# Patient Record
Sex: MALE | Race: BLACK OR AFRICAN AMERICAN
[De-identification: names, ages, dates, MRNs, and addresses within clinical notes are randomized per-mention and may not be internally consistent; named-entity substitution may affect disease eponyms.]

---

## 2018-09-01 NOTE — EKG
Test Reason : 

Blood Pressure : ***/*** mmHG

Vent. Rate : 108 BPM     Atrial Rate : 108 BPM

   P-R Int : 170 ms          QRS Dur : 076 ms

    QT Int : 332 ms       P-R-T Axes : 062 061 077 degrees

   QTc Int : 444 ms

 

Sinus tachycardia

Nonspecific ST and T wave abnormality

Abnormal ECG

 

Confirmed by KENDRICK LICONA ([id number]),  CHUY SAAVEDRA ([id number]) on 9/1/2018 8:00:06 PM

 

Referred By:             Confirmed By:KENDRICK LICONA

## 2019-03-20 NOTE — CT
CT HEAD WITHOUT CONTRAST:

 

03/20/2019

 

HISTORY:

Altered mental status and slurred speech with right-sided weakness.  Dry cough.

 

COMPARISON:

07/24/2017

 

FINDINGS:

The imaged paranasal sinuses/mastoid air cells appear well aerated.  There is no displaced calvarial 
fracture.

 

There is extensive periventricular, deep, and subcortical white matter hypodensity, evidence of small
 vessel disease, as seen on prior imaging.  Areas of prior infarction are noted near the vertex, in t
he right frontal parietal region.  Areas of prior lacunar infarction noted within the basal ganglia b
ilaterally.

 

IMPRESSION:

Chronic findings, as detailed above.  No intracranial hemorrhage.

 

If there is clinical concern for acute infarction, brain MRI recommended.

 

POS: [facility]

## 2019-03-21 NOTE — HP
PRIMARY CARE PROVIDER:  Dr. Jones at the [facility].



CHIEF COMPLAINT:  Difficulty with speech.



HISTORY OF PRESENT ILLNESS:  This is a 58-year-old  male who

presents to [facility] complaining of approximate

4-day history of difficulty with speech.  The patient noted the difficulty

approximately 4 to 5 days prior to this evaluation with family member stating 
that

he was not speaking at all initially.  The patient regained some slurred speech 
in

the last 24 to 48 hours with associated increased cough.  The family attributed 
his

cough to developing cold symptoms, however, when his speech did not return, they

became concerned.  The patient was apparently evaluated at the [facility] 
with

recommendations to transfer to [facility] for further evaluation and CT

imaging.  The patient states he has a history of CVA approximately 2 years 
prior to

this evaluation with residual right-sided weakness and some contracture of the 
right

upper extremity.  The patient needs standby/contact guard assistance for 
ambulation

at home according to family members.  The patient eats regular diet without

modification to the texture.  No specific history of fall, injury, fever, nausea
,

vomiting, or diarrhea.  The patient denies any other specific complaints other 
than

difficulty with speech and some difficulty when swallowing.  In the emergency 
room,

the patient underwent general evaluation including CT imaging of the brain 
showing

no acute intracranial process with chronic ischemic white matter changes.  The

patient received aspirin and was referred to the Hospitalist Service for

admission.  The patient does admit that he takes aspirin 325 mg daily. 



PAST MEDICAL HISTORY:  

1. Ischemic cerebrovascular accident with residual right hemiparesis and right

facial droop. 

2. Hypertension.

3. Hyperlipidemia.

4. Diabetes mellitus type 2.



PAST SURGICAL HISTORY:  

1. Status post left below-the-knee amputation secondary to a forklift accident.

2. Status post drainage of abscess on the neck.



CURRENT MEDICATIONS:  

1. Enteric-coated aspirin 325mg p.o. daily.

2. Lisinopril 5 mg p.o. daily.  

3. Glucotrol XL 5 mg p.o. b.i.d.

4. Metformin 1000 mg p.o. b.i.d.  

5. Simvastatin 10 mg p.o. at bedtime.



ALLERGIES:  NO KNOWN DRUG ALLERGIES.



FAMILY HISTORY:  Positive for hypertension and diabetes mellitus type 2.



SOCIAL HISTORY:  The patient is accompanied by his sister in the hospital.  
Resides

in [place], [place].  Disabled.  Remote history of marijuana use.  No current 
alcohol.

 Smokes cigars, quit in the last year.  Ambulatory with standby/contact guard

assistance.  Needs assistance with activities of daily living. 



REVIEW OF SYSTEMS:  CONSTITUTIONAL:  Negative for weight loss or gain, ability 
to

conduct usual activities. 

SKIN:  Negative for rash, itching. 

EYES:  Negative for double vision, pain. 

ENT/MOUTH:  Negative for nose bleeding, neck stiffness, pain, tenderness. 

CARDIOVASCULAR:  Negative for palpitations, dyspnea on exertion, orthopnea. 

RESPIRATORY:  Negative for shortness of breath, wheezing, cough, hemoptysis, 
fever

or night sweats. 

GASTROINTESTINAL:  Negative for poor appetite, abdominal pain, heartburn, nausea
,

vomiting, constipation, or diarrhea. 

GENITOURINARY:  Negative for urgency, frequency, dysuria, nocturia. 

MUSCULOSKELETAL:  Negative for pain, swelling. 

NEUROLOGIC/PSYCHIATRIC:  Negative for anxiety, depression. 

ALLERGY/IMMUNOLOGIC:  Negative for skin rash, bleeding tendency. 



Otherwise, negative except as stated per HPI.



PHYSICAL EXAMINATION:

VITAL SIGNS:  On admission, blood pressure 138/86, pulse 98, respiratory rate 18
,

temperature 98.9 degrees Fahrenheit, O2 saturation 98% on room air. 

GENERAL APPEARANCE:  This is a 58-year-old  male, alert and 
oriented

x3, pleasant with dysarthria. 

HEENT:  Pupils are equal, round, and reactive to light and accommodation.

Extraocular muscles are intact.  No scleral icterus.  No conjunctival injection.

Nares patent.  OP is clear.  Teeth in fair repair.  Right facial asymmetry 
noted. 

NECK:  Supple.  No cervical adenopathy.  No thyromegaly.  No carotid bruits.  
No JVD

appreciated.  Cervical spine with full active and passive range of motion.  No

meningeal signs noted. 

CHEST:  Lungs are clear to auscultation bilaterally. 

CARDIOVASCULAR:  S1 and S2 without noted murmur, rub, or gallop. 

ABDOMEN:  Rounded, soft, nontender, and nondistended.  Bowel sounds are 
positive in

all 4 quadrants.  There is no hepatosplenomegaly.  No abdominal bruits.  No 
rebound

or guarding appreciated. 

EXTREMITIES:  Warm and dry with fair turgor.  No clubbing, cyanosis, or 
asymmetric

edema appreciated.  Pulses are palpable distally at the dorsalis pedis, 
posterior

tibial, and popliteal arteries bilaterally.  Capillary refill less than 2 
seconds. 

NEUROLOGIC:  Positive dysarthria and expressive dysphasia.  Chronic right

hemiparesis with right upper extremity contracture.  Left below-the-knee 
amputation

noted. 



PERTINENT LAB AND X-RAY FINDINGS:  Sodium 138, potassium 4.1, chloride 106, CO2 
of

22, BUN 10, creatinine 0.81, estimated GFR greater than 90, glucose 120, calcium

8.9.  LFTs within normal limits.  CBC showed white blood cell count of 6.4,

hemoglobin 12, hematocrit 35, platelet count 210 with normal differential.  CT 
of

the brain without contrast dated 03/20/2019, showed chronic changes bilaterally

without acute process.  EKG dated 03/20/2019, by my interpretation shows sinus

mechanism with heart rates in the 70s.  Normal R-wave progression noted in the

precordial leads.  Normal axis.  No acute ST-T wave changes appreciated. 



ASSESSMENT AND PLAN:  

1. Acute/subacute ischemic cerebrovascular accident, suspect left-sided 
involvement

in Broca's region.  We will obtain MRI imaging to confirm.  Likely subacute

presentation.  Transition to Plavix 75 mg daily as the patient is likely an 
aspirin

failure.  Check fasting lipid profile per stroke protocol.  Check 2D 
transthoracic

echocardiogram and carotid Doppler study.  PT, OT, and Speech Therapy 
evaluation. 

2. Dysphasia.  We will obtain speech therapy consultation.  N.p.o. status 
pending

evaluation. 

3. Expressive dysphasia/dysarthria.  Secondarily to #1.  We will continue to 
monitor

clinically.  Continue general stroke protocol.  Plavix 75 mg p.o. daily. 

4. Hypertension.  Resume home regimen of lisinopril.  Serial blood pressure

monitoring. 

5. Hyperlipidemia.  Check fasting lipid profile in the a.m.  Continue metformin 
1000

mg p.o. b.i.d. 

6. Diabetes mellitus type 2.  Insulin sliding scale for reflexive coverage.

Metformin 1000 mg p.o. b.i.d.  Accu-Cheks a.c. and at bedtime.  ADA diet. 

7. Prophylaxis.  Sequential compression devices while in bed.  Pepcid 20 mg p.o.

b.i.d. 



CODE STATUS:  Full.  Surrogate medical decision maker is the patient's sister.







Job ID:  [id number]



MTDD

## 2019-03-21 NOTE — MRI
BRAIN MRI WITHOUT CONTRAST:

 

Date:  03/21/19 

 

COMPARISON:  

07/25/17. 

 

HISTORY:  

Altered mental status, right-sided weakness, slurred speech, assess for acute infarction. 

 

TECHNIQUE:  

Multiplanar, multisequence MR imaging of the brain provided without contrast. 

 

FINDINGS:

The diffusion-weighted imaging demonstrates a subcentimeter focus of restricted diffusion, consistent
 with acute infarction involving the ventral aspect of the cory just below the junction with the midb
rain on the right. This focus of acute infarction measures in the 5.0 mm range. No additional foci of
 restricted diffusion are noted. 

 

The axial gradient echo imaging demonstrates no evidence for acute hemorrhage. There is moderate diff
use cerebral volume loss. There is extensive multifocal periventricular, deep, and subcortical white 
matter T2 and FLAIR hyperintensity, evidence of small vessel disease. There is evidence of prior infa
rction involving the frontal lobe and frontoparietal region on the right near the vertex. No midline 
shift or mass effect is seen. The imaged paranasal sinuses/mastoid air cells appear well aerated. Reg
ional bone marrow signal intensity appears grossly unremarkable. 

 

IMPRESSION: 

5.0 mm acute infarction of the ventral aspect of the cory on the right. Evidence of small vessel dise
ase and prior infarctions as detailed above. 

 

 

POS: [facility]

## 2019-03-21 NOTE — PDOC.PN
- Subjective


Encounter Start Date: 03/21/19


Encounter Start Time: 11:09


Subjective: speech still dysarthric





- Objective


Resuscitation Status - Order Detail:





03/20/19 19:40


Resuscitation Status Routine 


   Resuscitation Status: FULL: Full Resuscitation








MAR Reviewed: Yes


Vital Signs & Weight: 


 Vital Signs (12 hours)











  Temp Pulse Resp BP BP Pulse Ox


 


 03/21/19 10:25   84   176/85 H  


 


 03/21/19 08:00  97.9 F  65  20   155/83 H  99


 


 03/21/19 04:00  98.1 F  75  16   124/70  100


 


 03/21/19 00:00  97.9 F  81  16   127/75  99








 Weight











Weight                         156 lb 11.979 oz














I&O: 


 











 03/20/19 03/21/19 03/22/19





 06:59 06:59 06:59


 


Intake Total  1021 


 


Output Total  875 


 


Balance  146 











Result Diagrams: 


 03/21/19 05:36





 03/21/19 05:36


Additional Labs: 


 Accuchecks











  03/21/19 03/21/19 03/20/19





  10:54 05:27 20:21


 


POC Glucose  125 H  100  96














  03/20/19





  16:49


 


POC Glucose  109











Radiology Reviewed by me: Yes (MRI acute cory infarct)





Phys Exam





- Physical Examination


Neck: no JVD


Respiratory: clear to auscultation bilateral


Cardiovascular: RRR, no significant murmur


Gastrointestinal: soft, positive bowel sounds


Musculoskeletal: no edema


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 old R hemiplegia





Dx/Plan


(1) Hemiplegia affecting right dominant side


Code(s): G81.91 - HEMIPLEGIA, UNSPECIFIED AFFECTING RIGHT DOMINANT SIDE   Status

: Acute   


Qualifiers: 


   Hemiplegia type: spastic   Hemiplegia etiology: late effect of 

cerebrovascular disease   Cerebrovascular disease type: cerebral infarction   

Qualified Code(s): I69.351 - Hemiplegia and hemiparesis following cerebral 

infarction affecting right dominant side   





(2) CVA (cerebral vascular accident)


Code(s): I63.9 - CEREBRAL INFARCTION, UNSPECIFIED   Status: Acute   


Qualifiers: 


   CVA mechanism: thrombosis 





(3) Diabetes


Code(s): E11.9 - TYPE 2 DIABETES MELLITUS WITHOUT COMPLICATIONS   Status: 

Chronic   


Qualifiers: 


   Diabetes mellitus type: type 2   Diabetes mellitus long term insulin use: 

without long term use   Diabetes mellitus complication status: with neurologic 

complications   Diabetes mellitus complication detail: with other neurological 

complication   Qualified Code(s): E11.49 - Type 2 diabetes mellitus with other 

diabetic neurological complication   





(4) Dyslipidemia


Code(s): E78.5 - HYPERLIPIDEMIA, UNSPECIFIED   Status: Chronic   





(5) HTN (hypertension)


Code(s): I10 - ESSENTIAL (PRIMARY) HYPERTENSION   Status: Chronic   


Qualifiers: 


   Hypertension type: essential hypertension   Qualified Code(s): I10 - 

Essential (primary) hypertension   





- Plan


cont ASA/plavix


-: cont PT/OT/speech


-: cont aaccu/SS/OHA


-: cont statin, lisinopril





* .

## 2019-03-21 NOTE — ULT
CAROTID ULTRASOUND:

 

HISTORY: 

CVA.

 

COMPARISON: 

7/25/2017.

 

TECHNIQUE:

Multiplanar, gray scale, and color Doppler images are obtained in a carotid ultrasound.  Spectral jarrod
lysis of the Doppler waveforms was performed.

 

FINDINGS: 

No significant plaque is seen in either common or internal carotid artery.  The Doppler waveforms are
 normal bilaterally.

 

Peak systolic velocity in the right ICA is 32 cm/s.  Peak systolic velocity in the right CCA is 53 cm
/s.  The right ICA/CCA ratio is 0.6.

 

Peak systolic velocity in th left ICA is 47 cm/s.  Peak systolic velocity in the left CCA is 84 cm/s.
  The left ICA/CCA ratio is 0.6.

 

Both vertebral arteries demonstrate antegrade flow without focal stenosis.

 

IMPRESSION: 

No evidence of hemodynamically significant stenosis.

 

POS: [facility]

## 2019-03-21 NOTE — CON
DATE OF CONSULTATION:  03/21/2019



CONSULTING PHYSICIAN:  Hospitalist Services.



IMPRESSION:  

1. Pontine stroke.

2. Prior stroke with right hemiparesis.

3. Hypertension.

4. Diabetes.

5. Aspirin failure.



PLAN:  

1. Add Plavix.

2. The patient will be discharged home.



HISTORY OF PRESENT ILLNESS:  Mr. Yeager is a 58-year-old man with a past history of

stroke two years ago, leaving him with a right hemiparesis.  He has been compliant

with his treatment.  He developed increased dysarthria and came into the emergency

room.  Initial CT scan showed extensive small vessel ischemic changes bilaterally.

MRI revealed an acute area of infarction involving the right cory.  His carotid

Dopplers are clear.  He had a normal echo last year with ejection fraction of 50% to

55%.  He reports that he is a bit better, but it has not cleared up to his baseline. 



PAST MEDICAL HISTORY:  As listed above.



ALLERGIES:  NONE REPORTED.



SOCIAL HISTORY:  He is .  Does not smoke.



FAMILY HISTORY:  Noncontributory.



MEDICATIONS:  Medication list was reviewed.



REVIEW OF SYSTEMS:  A 10-system review of systems is otherwise negative.



PHYSICAL EXAMINATION:

GENERAL:  He is a well-nourished, middle-aged man, lying in bed, in no distress. 

HEENT:  Pupils are equal and reactive.  Conjunctivae are clear.  Oropharynx is

clear.  Cranium, normocephalic and atraumatic. 

NECK:  Supple.  No lymphadenopathy. 

EXTREMITIES:  There is a left BKA.  No edema is present. 

NEUROLOGIC:  He is alert and cooperative.  His speech is fluent with a moderately

dysarthric quality.  Cranial nerve exam shows a right facial droop.  Motor exam

shows good strength on the left and flexion contracture of the right upper

extremity.  He has limited mobility in the right leg.  Sensation is intact.  No

abnormal movements are seen. 



IMAGING:  Reviewed. 



EKG shows a sinus rhythm.



SUMMARY:  This is an unfortunate middle-aged man with extensive small vessel disease

resulting in a new pontine infarct with limited neurologic injury.  Continue his

statin and aspirin, and add Plavix.  I would be happy to follow up with him as an

outpatient. 







Job ID:  [id number]

## 2019-03-22 NOTE — PDOC.PN
- Subjective


Encounter Start Date: 03/22/19


Encounter Start Time: 13:18


Subjective: Admitted with dysarthria due to acute CVA.


-: feeling better. No chest pain, palpitation or headache.


-: Now on thickened liquid due to dysphagia





- Objective


Resuscitation Status - Order Detail:





03/20/19 19:40


Resuscitation Status Routine 


   Resuscitation Status: FULL: Full Resuscitation








Vital Signs & Weight: 


 Vital Signs (12 hours)











  Temp Pulse Pulse Resp BP BP BP


 


 03/22/19 11:37  98.6 F  90   16    165/95 H


 


 03/22/19 10:12    75    176/86 H 


 


 03/22/19 08:55   70    136/73  


 


 03/22/19 08:00       


 


 03/22/19 07:40  98.6 F  70   17    138/77


 


 03/22/19 04:00  98.7 F  70   18    128/73














  Pulse Ox


 


 03/22/19 11:37  98


 


 03/22/19 10:12 


 


 03/22/19 08:55 


 


 03/22/19 08:00  96


 


 03/22/19 07:40  96


 


 03/22/19 04:00  100








 Weight











Admit Weight                   156 lb 11.979 oz


 


Weight                         156 lb 11.979 oz














I&O: 


 











 03/21/19 03/22/19 03/23/19





 06:59 06:59 06:59


 


Intake Total 1021 100 


 


Output Total 875 626 370


 


Balance 146 -275 -370











Result Diagrams: 


 03/21/19 05:36





 03/21/19 05:36


Additional Labs: 


 Accuchecks











  03/22/19 03/22/19 03/21/19





  10:56 05:49 19:22


 


POC Glucose  153 H  142 H  134 H














  03/21/19





  16:45


 


POC Glucose  199 H














Phys Exam





- Physical Examination


HEENT: PERRLA, moist MMs


Neck: no JVD, supple


Respiratory: no rales, no rhonchi


Fair air entry with some transmitted sound


Cardiovascular: RRR


Gastrointestinal: soft, non-tender, no distention


Musculoskeletal: no edema


Left BKA, Right upper limb fixed flexion deformity of the elbow and wrist


right hemiparesis


dysarthria noted.


Psychiatric: A&O x 3





Dx/Plan


(1) Acute CVA (cerebrovascular accident)


Code(s): I63.9 - CEREBRAL INFARCTION, UNSPECIFIED   Status: Acute   





(2) History of left below knee amputation


Code(s): Z89.512 - ACQUIRED ABSENCE OF LEFT LEG BELOW KNEE   Status: Acute   





(3) Hemiplegia affecting right dominant side


Code(s): G81.91 - HEMIPLEGIA, UNSPECIFIED AFFECTING RIGHT DOMINANT SIDE   Status

: Acute   


Qualifiers: 


   Hemiplegia type: spastic   Hemiplegia etiology: late effect of 

cerebrovascular disease   Cerebrovascular disease type: cerebral infarction   

Qualified Code(s): I69.351 - Hemiplegia and hemiparesis following cerebral 

infarction affecting right dominant side   





(4) DM2 (diabetes mellitus, type 2)


Status: Chronic   





(5) Dyslipidemia


Code(s): E78.5 - HYPERLIPIDEMIA, UNSPECIFIED   Status: Chronic   





(6) HTN (hypertension)


Code(s): I10 - ESSENTIAL (PRIMARY) HYPERTENSION   Status: Chronic   


Qualifiers: 


   Hypertension type: essential hypertension   Qualified Code(s): I10 - 

Essential (primary) hypertension   





(7) Gait instability


Code(s): R26.81 - UNSTEADINESS ON FEET   Status: Acute   





- Plan


Increase lisinopril to 20 daily.


-: continue ASA and plavix.


-: Continue other medications.


-: get Rehab evaluation


-: Monitor renal function.





* .

## 2019-03-23 NOTE — PDOC.PN
- Subjective


Encounter Start Date: 03/23/19


Encounter Start Time: 11:28


Subjective: No new problem.


-: Awaiting Placement





- Objective


Resuscitation Status - Order Detail:





03/20/19 19:40


Resuscitation Status Routine 


   Resuscitation Status: FULL: Full Resuscitation








Vital Signs & Weight: 


 Vital Signs (12 hours)











  Temp Pulse Resp BP BP Pulse Ox


 


 03/23/19 09:55     164/80 H  


 


 03/23/19 07:37  99 F  65  16   164/80 H  95


 


 03/23/19 04:00  97.8 F  72  16   153/73 H  100


 


 03/23/19 00:00  98.4 F  75  16   125/74  100








 Weight











Admit Weight                   156 lb 11.979 oz


 


Weight                         156 lb 11.979 oz














I&O: 


 











 03/22/19 03/23/19 03/24/19





 06:59 06:59 06:59


 


Intake Total 100 237 


 


Output Total 375 1020 


 


Balance -016 -098 











Result Diagrams: 


 03/21/19 05:36





 03/23/19 05:59


Additional Labs: 


 Accuchecks











  03/23/19 03/23/19 03/22/19





  10:57 05:21 20:07


 


POC Glucose  195 H  132 H  192 H














  03/22/19





  16:56


 


POC Glucose  133 H














Phys Exam





- Physical Examination


Constitutional: NAD


HEENT: PERRLA, moist MMs


Neck: supple


Respiratory: no rhonchi, clear to auscultation bilateral


Cardiovascular: RRR, no significant murmur


Gastrointestinal: soft, non-tender, no distention, positive bowel sounds


Musculoskeletal: no edema


Right upper limb fixed flexion defomity at the elbow and wrist.


LBKA noted


Right hemiparesis noted.


Psychiatric: A&O x 3





Dx/Plan


(1) Acute CVA (cerebrovascular accident)


Code(s): I63.9 - CEREBRAL INFARCTION, UNSPECIFIED   Status: Acute   





(2) History of left below knee amputation


Code(s): Z89.512 - ACQUIRED ABSENCE OF LEFT LEG BELOW KNEE   Status: Acute   





(3) Hemiplegia affecting right dominant side


Code(s): G81.91 - HEMIPLEGIA, UNSPECIFIED AFFECTING RIGHT DOMINANT SIDE   Status

: Acute   


Qualifiers: 


   Hemiplegia type: spastic   Hemiplegia etiology: late effect of 

cerebrovascular disease   Cerebrovascular disease type: cerebral infarction   

Qualified Code(s): I69.351 - Hemiplegia and hemiparesis following cerebral 

infarction affecting right dominant side   





(4) DM2 (diabetes mellitus, type 2)


Status: Chronic   





(5) Dyslipidemia


Code(s): E78.5 - HYPERLIPIDEMIA, UNSPECIFIED   Status: Chronic   





(6) HTN (hypertension)


Code(s): I10 - ESSENTIAL (PRIMARY) HYPERTENSION   Status: Chronic   


Qualifiers: 


   Hypertension type: essential hypertension   Qualified Code(s): I10 - 

Essential (primary) hypertension   





(7) Gait instability


Code(s): R26.81 - UNSTEADINESS ON FEET   Status: Acute   





- Plan


Increase lisinopril to 20 bidContinue other treatments.


-: awaiting placement.





* .

## 2019-07-08 NOTE — DIS
DATE OF ADMISSION:  03/20/2019



DATE OF DISCHARGE:  03/23/2019



DISCHARGE DIAGNOSES:  

1. Acute cerebrovascular accident.

2. Prior cerebrovascular accident with residual right hemiplegia.

3. Status post left below-knee amputation.

4. Gait instability.

5. Dysphagia.

6. Hypertension.

7. Dyslipidemia.

8. Type 2 diabetes mellitus.



CONSULTS:  

1. Neurology.

2. Physical Therapy.

3. Occupational Therapy.



HOSPITAL COURSE:  A 58-year-old male with prior history of stroke with residual

right-sided hemiparesis, admitted with acute onset of dysarthria.  Initial CT showed

extensive small-vessel changes bilaterally with subsequent MRI revealed acute

infarct involving the right cory.  Carotid Dopplers were unremarkable, and the

echocardiogram showed preserved ejection fraction of 50% to 55%.  The patient was

started on restorative therapy with PT, OT, and Speech.  He was found to have

dysphagia, hence was started on nectar thickened liquid with puree consistency.  The

patient also received physical therapy with improvement.  He however was found to

have gait instability, which was magnified by use of left prosthesis, hence was

discharged to a skilled nursing facility for restorative therapy.  The patient also

was found to have uncontrolled high blood pressure.  Hence, lisinopril dose was

increased from 5 b.i.d. to 20 b.i.d.  The patient also was started on Plavix in

addition to aspirin as recommended by Neurology since it is deemed that he failed

aspirin.  Also Lipitor was increased to 80 mg. 



PHYSICAL EXAMINATION:

VITAL SIGNS:  Temperature 98.3, pulse 70, respiratory rate 16, SpO2 99 on room air.

Blood pressure 149/78. 

GENERAL:  Middle aged male, in no obvious distress.  Afebrile.  Anicteric. 

HEENT:  Normocephalic, atraumatic.  Pupils are equal and reacting to light. 

RESPIRATORY:  Good air entry bilateral with no obvious crackle or rhonchi. 

CARDIOVASCULAR:  Regular rhythm and rate with normal heart sounds 1 and 2.  No

murmur was appreciated. 

GI:  Abdomen is full, soft, nontender, nondistended with normal bowel sounds. 

EXTREMITIES:  Left BKA noted.  No obvious edema or erythema was appreciated. 

NEUROLOGIC:  Conscious and alert, oriented x3 with appropriate mental status.  Some

dysarthria noted.  Right-sided mild hemiparesis noted. 



DIAGNOSTIC DATA:  MRI performed on 03/21 showed a 5.0 mm acute infarction of the

ventral aspect of the cory on the right side.  Also noted were evidence of

small-vessel disease and prior infarctions. 



DISCHARGE CONDITION:  Improved and stable.



FOLLOWUP:  The patient is to follow with PCP in 1 week of discharge from the skilled

nursing facility. 



DISCHARGE MEDICATIONS:  

1. Glipizide 5 mg p.o. b.i.d.

2. Metformin 1000 mg p.o. b.i.d.

3. Acetaminophen 1000 mg p.o. q.6 p.r.n.

4. Aspirin 325 mg p.o. daily.

5. Lipitor 40 mg p.o. daily.

6. Plavix 75 mg p.o. daily.

7. Lisinopril 20 mg p.o. b.i.d.

8. Ondansetron 4 mg p.o. q.6 p.r.n. for nausea.



TIME SPENT:  Discharge took more than 37 minutes.







Job ID:  [id number]
moderate assist (50% patients effort)

## 2020-01-23 ENCOUNTER — HOSPITAL ENCOUNTER (EMERGENCY)
Dept: HOSPITAL 92 - ERS | Age: 60
Discharge: HOME | End: 2020-01-23
Payer: MEDICAID

## 2020-01-23 DIAGNOSIS — Z79.899: ICD-10-CM

## 2020-01-23 DIAGNOSIS — Z86.73: ICD-10-CM

## 2020-01-23 DIAGNOSIS — E78.00: ICD-10-CM

## 2020-01-23 DIAGNOSIS — Z87.891: ICD-10-CM

## 2020-01-23 DIAGNOSIS — S01.112A: Primary | ICD-10-CM

## 2020-01-23 DIAGNOSIS — S01.21XA: ICD-10-CM

## 2020-01-23 DIAGNOSIS — I10: ICD-10-CM

## 2020-01-23 DIAGNOSIS — E11.9: ICD-10-CM
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
## 2020-01-23 DIAGNOSIS — Z79.84: ICD-10-CM

## 2020-01-23 DIAGNOSIS — M62.421: ICD-10-CM

## 2020-01-23 DIAGNOSIS — Z79.82: ICD-10-CM

## 2020-01-23 DIAGNOSIS — E78.5: ICD-10-CM

## 2020-01-23 PROCEDURE — 12011 RPR F/E/E/N/L/M 2.5 CM/<: CPT

## 2020-12-19 ENCOUNTER — HOSPITAL ENCOUNTER (EMERGENCY)
Dept: HOSPITAL 92 - ERS | Age: 60
Discharge: HOME | End: 2020-12-19
Payer: MEDICARE

## 2020-12-19 DIAGNOSIS — Z87.891: ICD-10-CM

## 2020-12-19 DIAGNOSIS — Z86.73: ICD-10-CM

## 2020-12-19 DIAGNOSIS — E11.9: ICD-10-CM

## 2020-12-19 DIAGNOSIS — E78.5: ICD-10-CM

## 2020-12-19 DIAGNOSIS — I10: ICD-10-CM

## 2020-12-19 DIAGNOSIS — E78.00: ICD-10-CM

## 2020-12-19 DIAGNOSIS — K59.00: Primary | ICD-10-CM

## 2020-12-19 PROCEDURE — 74022 RADEX COMPL AQT ABD SERIES: CPT

## 2020-12-19 NOTE — RAD
KUB AND UPRIGHT AND PA CHEST:

 

HISTORY: 

Abdominal pain, constipation.

 

FINDINGS: 

Some gaseous distention of the abdomen.  There is some moderate stool present within the transverse a
nd descending colon and prominent stool in the rectosigmoid region.  No free air.  No definite renal 
calculi.  There are arthritic changes of the spine and hips.

 

PA CHEST:

Heart size is within normal limits.  The lungs are clear of infiltrates.  There is slight elevation o
f the right hemidiaphragm.

 

IMPRESSION: 

A moderate amount of stool within the colon.  No obstruction or free air.

 

POS: [facility]ME[facility]

## 2021-01-09 ENCOUNTER — HOSPITAL ENCOUNTER (EMERGENCY)
Dept: HOSPITAL 92 - ERS | Age: 61
Discharge: HOME | End: 2021-01-09
Payer: MEDICARE

## 2021-01-09 DIAGNOSIS — K59.00: ICD-10-CM

## 2021-01-09 DIAGNOSIS — E78.5: ICD-10-CM

## 2021-01-09 DIAGNOSIS — E78.00: ICD-10-CM

## 2021-01-09 DIAGNOSIS — Z86.73: ICD-10-CM

## 2021-01-09 DIAGNOSIS — K52.9: ICD-10-CM

## 2021-01-09 DIAGNOSIS — N30.90: Primary | ICD-10-CM

## 2021-01-09 DIAGNOSIS — E11.9: ICD-10-CM

## 2021-01-09 DIAGNOSIS — Z87.891: ICD-10-CM

## 2021-01-09 DIAGNOSIS — I10: ICD-10-CM

## 2021-01-09 LAB
ALBUMIN SERPL BCG-MCNC: 4 G/DL (ref 3.5–5)
ALP SERPL-CCNC: 66 U/L (ref 40–110)
ALT SERPL W P-5'-P-CCNC: (no result) U/L (ref 8–55)
ANION GAP SERPL CALC-SCNC: 13 MMOL/L (ref 10–20)
AST SERPL-CCNC: 7 U/L (ref 5–34)
BACTERIA UR QL AUTO: (no result) HPF
BASOPHILS # BLD AUTO: 0.1 THOU/UL (ref 0–0.2)
BASOPHILS NFR BLD AUTO: 0.7 % (ref 0–1)
BILIRUB SERPL-MCNC: 0.6 MG/DL (ref 0.2–1.2)
BUN SERPL-MCNC: 10 MG/DL (ref 8.4–25.7)
CALCIUM SERPL-MCNC: 8.8 MG/DL (ref 7.8–10.44)
CHLORIDE SERPL-SCNC: 103 MMOL/L (ref 98–107)
CO2 SERPL-SCNC: 23 MMOL/L (ref 22–29)
CREAT CL PREDICTED SERPL C-G-VRATE: 0 ML/MIN (ref 70–130)
EOSINOPHIL # BLD AUTO: 0.1 THOU/UL (ref 0–0.7)
EOSINOPHIL NFR BLD AUTO: 0.8 % (ref 0–10)
GLOBULIN SER CALC-MCNC: 3.5 G/DL (ref 2.4–3.5)
GLUCOSE SERPL-MCNC: 156 MG/DL (ref 70–105)
HGB BLD-MCNC: 11.9 G/DL (ref 14–18)
LEUKOCYTE ESTERASE UR QL STRIP.AUTO: 75 LEU/UL
LIPASE SERPL-CCNC: 24 U/L (ref 8–78)
LYMPHOCYTES # BLD: 1.5 THOU/UL (ref 1.2–3.4)
LYMPHOCYTES NFR BLD AUTO: 18 % (ref 21–51)
MCH RBC QN AUTO: 30.3 PG (ref 27–31)
MCV RBC AUTO: 87.7 FL (ref 78–98)
MONOCYTES # BLD AUTO: 0.4 THOU/UL (ref 0.11–0.59)
MONOCYTES NFR BLD AUTO: 4.7 % (ref 0–10)
NEUTROPHILS # BLD AUTO: 6.3 THOU/UL (ref 1.4–6.5)
NEUTROPHILS NFR BLD AUTO: 75.9 % (ref 42–75)
PLATELET # BLD AUTO: 213 THOU/UL (ref 130–400)
POTASSIUM SERPL-SCNC: 4 MMOL/L (ref 3.5–5.1)
RBC # BLD AUTO: 3.93 MILL/UL (ref 4.7–6.1)
RBC UR QL AUTO: (no result) HPF (ref 0–3)
SODIUM SERPL-SCNC: 135 MMOL/L (ref 136–145)
SP GR UR STRIP: 1.01 (ref 1–1.04)
WBC # BLD AUTO: 8.3 THOU/UL (ref 4.8–10.8)
WBC UR QL AUTO: (no result) HPF (ref 0–3)

## 2021-01-09 PROCEDURE — 85025 COMPLETE CBC W/AUTO DIFF WBC: CPT

## 2021-01-09 PROCEDURE — 80053 COMPREHEN METABOLIC PANEL: CPT

## 2021-01-09 PROCEDURE — 81003 URINALYSIS AUTO W/O SCOPE: CPT

## 2021-01-09 PROCEDURE — 93005 ELECTROCARDIOGRAM TRACING: CPT

## 2021-01-09 PROCEDURE — 96366 THER/PROPH/DIAG IV INF ADDON: CPT

## 2021-01-09 PROCEDURE — 74177 CT ABD & PELVIS W/CONTRAST: CPT

## 2021-01-09 PROCEDURE — 87077 CULTURE AEROBIC IDENTIFY: CPT

## 2021-01-09 PROCEDURE — 71045 X-RAY EXAM CHEST 1 VIEW: CPT

## 2021-01-09 PROCEDURE — 87086 URINE CULTURE/COLONY COUNT: CPT

## 2021-01-09 PROCEDURE — 87186 SC STD MICRODIL/AGAR DIL: CPT

## 2021-01-09 PROCEDURE — 51701 INSERT BLADDER CATHETER: CPT

## 2021-01-09 PROCEDURE — 83690 ASSAY OF LIPASE: CPT

## 2021-01-09 PROCEDURE — 96365 THER/PROPH/DIAG IV INF INIT: CPT

## 2021-01-09 PROCEDURE — 36415 COLL VENOUS BLD VENIPUNCTURE: CPT

## 2021-01-09 PROCEDURE — 81015 MICROSCOPIC EXAM OF URINE: CPT

## 2021-01-09 PROCEDURE — 83605 ASSAY OF LACTIC ACID: CPT

## 2021-01-09 NOTE — CT
CT ABDOMEN AND PELVIS PERFORMED WITH CONTRAST ENHANCEMENT:

1/9/21

 

HISTORY: 

Abdominal pain, constipation. 

 

COMPARISON: 

A 7/29/19 study. 

 

The lung bases show some bibasilar parenchymal lung changes that are similar to the previous exam and
 are probably all related to scarring given the chronicity. 

 

The liver and spleen show no focal abnormalities. The pancreas region is unremarkable. The gallbladde
r is contracted with small stones present. Some minimal intrahepatic ductal prominence present. Extra
hepatic duct does not appear dilated. 

 

Right and left adrenal glands and right and left kidneys are normal in size. There is no significant 
periaortic or mesenteric adenopathy. 

 

CT OF PELVIS PERFORMED WITH CONTRAST:

Large amount of stool is seen within the descending and sigmoid colon and rectosigmoid region. Some p
resacral fat stranding would suggest possibility of a stercoral colitis. No pelvic lymphadenopathy or
 mass. 

 

IMPRESSION: 

1.      Contracted gallbladder with gallstones. 

2.      Large amount of stool mainly in the descending and sigmoid colon. Suggestion of some wall thi
ckening to the rectum region. Some minimal fat stranding in the presacral fat. Changes would suggest 
the possibility of a stercoral colitis. 

 

POS: YASME[facility]

## 2021-01-09 NOTE — RAD
PORTABLE CHEST:

1/9/21

 

HISTORY: 

Tachycardia. 

 

COMPARISON: 

7/29/19 study.

 

Heart size and mediastinum are within normal limits. The lungs are clear of any infiltrative process.
 

 

IMPRESSION: 

No active intrathoracic disease. 

 

POS: [facility]ME[facility]

## 2021-01-16 NOTE — EKG
Test Reason : 

Blood Pressure : ***/*** mmHG

Vent. Rate : 105 BPM     Atrial Rate : 105 BPM

   P-R Int : 168 ms          QRS Dur : 076 ms

    QT Int : 338 ms       P-R-T Axes : 068 051 038 degrees

   QTc Int : 446 ms

 

Sinus tachycardia

Septal infarct , age undetermined

Abnormal ECG

 

Confirmed by AMOL PEREZ [id number]),  BLAS MAYO ([id number]) on 1/16/2021 10:18:23 AM

 

Referred By:             Confirmed By:AMOL VARGAS

## 2021-02-06 NOTE — RAD
RADIOGRAPH CHEST 1 VIEW:



DATE:

2/6/2021



HISTORY:

60-year-old male with altered mental status. Concern for aspiration.



FINDINGS:

Patient's right hand and wrist completely obscures right lateral mid and lower lung zones. There are 
no airspace densities, pulmonary edema, pneumothorax, or cardiomegaly. The lateral costophrenic

angles are sharp.



IMPRESSION:

No acute cardiopulmonary findings.



Reported By: Kel Vyas 

Electronically Signed:  2/6/2021 4:20 PM

## 2021-02-06 NOTE — CT
CT BRAIN NONCONTRAST:



DATE:

2/6/2021



HISTORY:

60-year-old male with altered mental status, aphasia



COMPARISON:

6/28/2019



FINDINGS:

Diffuse involutional changes of the brain, and advanced chronic ischemic white matter changes of the 
brain, both greater than expected for this age group.

No obstructive hydrocephalus, acute intra-axial or extra-axial hemorrhage, mass effect, midline shift
, or extra-axial fluid collection.

Several small old cortical infarctions in bilateral frontal and right parietal regions.

Numerous small and tiny old lacunar infarctions in bilateral basal ganglia, thalami, and brainstem.

No interval change overall.



IMPRESSION:

1) no acute intracranial findings.

2) multiple small old cortical cerebral infarctions.

3) numerous tiny old lacunar infarctions in deep gray nuclei and brainstem

4) involutional changes and chronic ischemic white matter changes, greater than expected for this age
.



Reported By: Kel Vyas 

Electronically Signed:  2/6/2021 4:32 PM

## 2021-10-06 ENCOUNTER — HOSPITAL ENCOUNTER (INPATIENT)
Dept: HOSPITAL 92 - CSHERS | Age: 61
LOS: 16 days | Discharge: NURSING, REHAB OR LONG-TERM CARE | DRG: 871 | End: 2021-10-22
Attending: INTERNAL MEDICINE | Admitting: INTERNAL MEDICINE
Payer: MEDICARE

## 2021-10-06 VITALS — BODY MASS INDEX: 22.6 KG/M2

## 2021-10-06 DIAGNOSIS — Z79.899: ICD-10-CM

## 2021-10-06 DIAGNOSIS — I69.391: ICD-10-CM

## 2021-10-06 DIAGNOSIS — Z74.01: ICD-10-CM

## 2021-10-06 DIAGNOSIS — D63.8: ICD-10-CM

## 2021-10-06 DIAGNOSIS — R65.20: ICD-10-CM

## 2021-10-06 DIAGNOSIS — Z86.16: ICD-10-CM

## 2021-10-06 DIAGNOSIS — R13.10: ICD-10-CM

## 2021-10-06 DIAGNOSIS — G40.909: ICD-10-CM

## 2021-10-06 DIAGNOSIS — E78.5: ICD-10-CM

## 2021-10-06 DIAGNOSIS — E87.0: ICD-10-CM

## 2021-10-06 DIAGNOSIS — N17.9: ICD-10-CM

## 2021-10-06 DIAGNOSIS — I10: ICD-10-CM

## 2021-10-06 DIAGNOSIS — E11.65: ICD-10-CM

## 2021-10-06 DIAGNOSIS — Z79.84: ICD-10-CM

## 2021-10-06 DIAGNOSIS — E43: ICD-10-CM

## 2021-10-06 DIAGNOSIS — R05.3: ICD-10-CM

## 2021-10-06 DIAGNOSIS — I69.351: ICD-10-CM

## 2021-10-06 DIAGNOSIS — S41.101A: ICD-10-CM

## 2021-10-06 DIAGNOSIS — I69.320: ICD-10-CM

## 2021-10-06 DIAGNOSIS — Z20.822: ICD-10-CM

## 2021-10-06 DIAGNOSIS — Z79.890: ICD-10-CM

## 2021-10-06 DIAGNOSIS — A41.9: Primary | ICD-10-CM

## 2021-10-06 DIAGNOSIS — Z89.512: ICD-10-CM

## 2021-10-06 DIAGNOSIS — Z93.1: ICD-10-CM

## 2021-10-06 DIAGNOSIS — E87.5: ICD-10-CM

## 2021-10-06 DIAGNOSIS — R40.4: ICD-10-CM

## 2021-10-06 LAB
ALBUMIN SERPL BCG-MCNC: 3.4 G/DL (ref 3.5–5)
ALP SERPL-CCNC: 66 U/L (ref 40–110)
ALT SERPL W P-5'-P-CCNC: 18 U/L (ref 8–55)
ANION GAP SERPL CALC-SCNC: 15 MMOL/L (ref 10–20)
AST SERPL-CCNC: 15 U/L (ref 5–34)
BASOPHILS # BLD AUTO: 0.1 10X3/UL (ref 0–0.2)
BASOPHILS NFR BLD AUTO: 0.6 % (ref 0–2)
BILIRUB SERPL-MCNC: 0.3 MG/DL (ref 0.2–1.2)
BUN SERPL-MCNC: 84 MG/DL (ref 8.4–25.7)
CALCIUM SERPL-MCNC: 9.6 MG/DL (ref 7.8–10.44)
CHLORIDE SERPL-SCNC: 118 MMOL/L (ref 98–107)
CK MB SERPL-MCNC: 0.3 NG/ML (ref 0–6.6)
CO2 SERPL-SCNC: 27 MMOL/L (ref 22–29)
CREAT CL PREDICTED SERPL C-G-VRATE: 0 ML/MIN (ref 70–130)
EOSINOPHIL # BLD AUTO: 0.3 10X3/UL (ref 0–0.5)
EOSINOPHIL NFR BLD AUTO: 1.9 % (ref 0–6)
GLOBULIN SER CALC-MCNC: 4 G/DL (ref 2.4–3.5)
GLUCOSE SERPL-MCNC: 198 MG/DL (ref 70–105)
HGB BLD-MCNC: 8.7 G/DL (ref 13.5–17.5)
LYMPHOCYTES NFR BLD AUTO: 20.7 % (ref 18–47)
MAGNESIUM SERPL-MCNC: 2.9 MG/DL (ref 1.6–2.6)
MCH RBC QN AUTO: 28.2 PG (ref 27–33)
MCV RBC AUTO: 89.6 FL (ref 81.2–95.1)
MONOCYTES # BLD AUTO: 0.9 10X3/UL (ref 0–1.1)
MONOCYTES NFR BLD AUTO: 6.3 % (ref 0–10)
NEUTROPHILS # BLD AUTO: 10.1 10X3/UL (ref 1.5–8.4)
NEUTROPHILS NFR BLD AUTO: 69.8 % (ref 40–75)
PLATELET # BLD AUTO: 134 10X3/UL (ref 150–450)
POTASSIUM SERPL-SCNC: 5.1 MMOL/L (ref 3.5–5.1)
RBC # BLD AUTO: 3.09 10X6/UL (ref 4.32–5.72)
SODIUM SERPL-SCNC: 155 MMOL/L (ref 136–145)
SP GR UR STRIP: 1.01 (ref 1–1.04)
TROPONIN I SERPL DL<=0.01 NG/ML-MCNC: 0.09 NG/ML (ref ?–0.03)
TROPONIN I SERPL DL<=0.01 NG/ML-MCNC: 0.09 NG/ML (ref ?–0.03)
WBC # BLD AUTO: 14.4 10X3/UL (ref 3.5–10.5)

## 2021-10-06 PROCEDURE — U0003 INFECTIOUS AGENT DETECTION BY NUCLEIC ACID (DNA OR RNA); SEVERE ACUTE RESPIRATORY SYNDROME CORONAVIRUS 2 (SARS-COV-2) (CORONAVIRUS DISEASE [COVID-19]), AMPLIFIED PROBE TECHNIQUE, MAKING USE OF HIGH THROUGHPUT TECHNOLOGIES AS DESCRIBED BY CMS-2020-01-R: HCPCS

## 2021-10-06 PROCEDURE — 96366 THER/PROPH/DIAG IV INF ADDON: CPT

## 2021-10-06 PROCEDURE — 80048 BASIC METABOLIC PNL TOTAL CA: CPT

## 2021-10-06 PROCEDURE — 93005 ELECTROCARDIOGRAM TRACING: CPT

## 2021-10-06 PROCEDURE — 70450 CT HEAD/BRAIN W/O DYE: CPT

## 2021-10-06 PROCEDURE — 94760 N-INVAS EAR/PLS OXIMETRY 1: CPT

## 2021-10-06 PROCEDURE — 80053 COMPREHEN METABOLIC PANEL: CPT

## 2021-10-06 PROCEDURE — 84520 ASSAY OF UREA NITROGEN: CPT

## 2021-10-06 PROCEDURE — 87205 SMEAR GRAM STAIN: CPT

## 2021-10-06 PROCEDURE — 85027 COMPLETE CBC AUTOMATED: CPT

## 2021-10-06 PROCEDURE — 87086 URINE CULTURE/COLONY COUNT: CPT

## 2021-10-06 PROCEDURE — 87040 BLOOD CULTURE FOR BACTERIA: CPT

## 2021-10-06 PROCEDURE — 85025 COMPLETE CBC W/AUTO DIFF WBC: CPT

## 2021-10-06 PROCEDURE — 71045 X-RAY EXAM CHEST 1 VIEW: CPT

## 2021-10-06 PROCEDURE — 87186 SC STD MICRODIL/AGAR DIL: CPT

## 2021-10-06 PROCEDURE — 87077 CULTURE AEROBIC IDENTIFY: CPT

## 2021-10-06 PROCEDURE — 96375 TX/PRO/DX INJ NEW DRUG ADDON: CPT

## 2021-10-06 PROCEDURE — 84484 ASSAY OF TROPONIN QUANT: CPT

## 2021-10-06 PROCEDURE — 96365 THER/PROPH/DIAG IV INF INIT: CPT

## 2021-10-06 PROCEDURE — 83605 ASSAY OF LACTIC ACID: CPT

## 2021-10-06 PROCEDURE — 80202 ASSAY OF VANCOMYCIN: CPT

## 2021-10-06 PROCEDURE — 87070 CULTURE OTHR SPECIMN AEROBIC: CPT

## 2021-10-06 PROCEDURE — 83735 ASSAY OF MAGNESIUM: CPT

## 2021-10-06 PROCEDURE — U0002 COVID-19 LAB TEST NON-CDC: HCPCS

## 2021-10-06 PROCEDURE — C9113 INJ PANTOPRAZOLE SODIUM, VIA: HCPCS

## 2021-10-06 PROCEDURE — 71250 CT THORAX DX C-: CPT

## 2021-10-06 PROCEDURE — U0005 INFEC AGEN DETEC AMPLI PROBE: HCPCS

## 2021-10-06 PROCEDURE — 36415 COLL VENOUS BLD VENIPUNCTURE: CPT

## 2021-10-06 PROCEDURE — 36416 COLLJ CAPILLARY BLOOD SPEC: CPT

## 2021-10-06 PROCEDURE — 81003 URINALYSIS AUTO W/O SCOPE: CPT

## 2021-10-06 PROCEDURE — 82565 ASSAY OF CREATININE: CPT

## 2021-10-06 PROCEDURE — 82553 CREATINE MB FRACTION: CPT

## 2021-10-07 LAB
ALBUMIN SERPL BCG-MCNC: 2.9 G/DL (ref 3.5–5)
ALP SERPL-CCNC: 51 U/L (ref 40–110)
ALT SERPL W P-5'-P-CCNC: 14 U/L (ref 8–55)
ANION GAP SERPL CALC-SCNC: 13 MMOL/L (ref 10–20)
AST SERPL-CCNC: 14 U/L (ref 5–34)
BASOPHILS # BLD AUTO: 0.1 10X3/UL (ref 0–0.2)
BASOPHILS NFR BLD AUTO: 0.4 % (ref 0–2)
BILIRUB SERPL-MCNC: 0.5 MG/DL (ref 0.2–1.2)
BUN SERPL-MCNC: 63 MG/DL (ref 8.4–25.7)
CALCIUM SERPL-MCNC: 8.5 MG/DL (ref 7.8–10.44)
CHLORIDE SERPL-SCNC: 120 MMOL/L (ref 98–107)
CO2 SERPL-SCNC: 22 MMOL/L (ref 22–29)
CREAT CL PREDICTED SERPL C-G-VRATE: 63 ML/MIN (ref 70–130)
EOSINOPHIL # BLD AUTO: 0.6 10X3/UL (ref 0–0.5)
EOSINOPHIL NFR BLD AUTO: 4.2 % (ref 0–6)
GLOBULIN SER CALC-MCNC: 3.5 G/DL (ref 2.4–3.5)
GLUCOSE SERPL-MCNC: 207 MG/DL (ref 70–105)
HGB BLD-MCNC: 7.4 G/DL (ref 13.5–17.5)
LYMPHOCYTES NFR BLD AUTO: 16.8 % (ref 18–47)
MCH RBC QN AUTO: 29 PG (ref 27–33)
MCV RBC AUTO: 91.4 FL (ref 81.2–95.1)
MONOCYTES # BLD AUTO: 0.9 10X3/UL (ref 0–1.1)
MONOCYTES NFR BLD AUTO: 6.2 % (ref 0–10)
NEUTROPHILS # BLD AUTO: 10 10X3/UL (ref 1.5–8.4)
NEUTROPHILS NFR BLD AUTO: 71.6 % (ref 40–75)
PLATELET # BLD AUTO: 108 10X3/UL (ref 150–450)
POTASSIUM SERPL-SCNC: 4.9 MMOL/L (ref 3.5–5.1)
RBC # BLD AUTO: 2.55 10X6/UL (ref 4.32–5.72)
SODIUM SERPL-SCNC: 150 MMOL/L (ref 136–145)
WBC # BLD AUTO: 14 10X3/UL (ref 3.5–10.5)

## 2021-10-07 RX ADMIN — INSULIN LISPRO PRN UNIT: 100 INJECTION, SOLUTION INTRAVENOUS; SUBCUTANEOUS at 11:42

## 2021-10-07 RX ADMIN — VANCOMYCIN HYDROCHLORIDE SCH MLS: 1 INJECTION, POWDER, LYOPHILIZED, FOR SOLUTION INTRAVENOUS at 20:23

## 2021-10-08 LAB
ALBUMIN SERPL BCG-MCNC: 3 G/DL (ref 3.5–5)
ALP SERPL-CCNC: 57 U/L (ref 40–110)
ALT SERPL W P-5'-P-CCNC: 12 U/L (ref 8–55)
ANION GAP SERPL CALC-SCNC: 13 MMOL/L (ref 10–20)
AST SERPL-CCNC: 17 U/L (ref 5–34)
BASOPHILS # BLD AUTO: 0 10X3/UL (ref 0–0.2)
BASOPHILS NFR BLD AUTO: 0.5 % (ref 0–2)
BILIRUB SERPL-MCNC: 0.4 MG/DL (ref 0.2–1.2)
BUN SERPL-MCNC: 32 MG/DL (ref 8.4–25.7)
CALCIUM SERPL-MCNC: 9 MG/DL (ref 7.8–10.44)
CHLORIDE SERPL-SCNC: 117 MMOL/L (ref 98–107)
CO2 SERPL-SCNC: 20 MMOL/L (ref 22–29)
CREAT CL PREDICTED SERPL C-G-VRATE: 84 ML/MIN (ref 70–130)
EOSINOPHIL # BLD AUTO: 0.4 10X3/UL (ref 0–0.5)
EOSINOPHIL NFR BLD AUTO: 5.4 % (ref 0–6)
GLOBULIN SER CALC-MCNC: 3.7 G/DL (ref 2.4–3.5)
GLUCOSE SERPL-MCNC: 130 MG/DL (ref 70–105)
HGB BLD-MCNC: 7.9 G/DL (ref 13.5–17.5)
LYMPHOCYTES NFR BLD AUTO: 25.3 % (ref 18–47)
MCH RBC QN AUTO: 28 PG (ref 27–33)
MCV RBC AUTO: 87.9 FL (ref 81.2–95.1)
MONOCYTES # BLD AUTO: 0.7 10X3/UL (ref 0–1.1)
MONOCYTES NFR BLD AUTO: 9.1 % (ref 0–10)
NEUTROPHILS # BLD AUTO: 4.3 10X3/UL (ref 1.5–8.4)
NEUTROPHILS NFR BLD AUTO: 58.5 % (ref 40–75)
PLATELET # BLD AUTO: 119 10X3/UL (ref 150–450)
POTASSIUM SERPL-SCNC: 4.2 MMOL/L (ref 3.5–5.1)
RBC # BLD AUTO: 2.82 10X6/UL (ref 4.32–5.72)
SODIUM SERPL-SCNC: 146 MMOL/L (ref 136–145)
VANCOMYCIN TROUGH SERPL-MCNC: 10.2 UG/ML
WBC # BLD AUTO: 7.3 10X3/UL (ref 3.5–10.5)

## 2021-10-08 RX ADMIN — INSULIN LISPRO PRN UNIT: 100 INJECTION, SOLUTION INTRAVENOUS; SUBCUTANEOUS at 06:33

## 2021-10-08 RX ADMIN — VANCOMYCIN HYDROCHLORIDE SCH MLS: 1 INJECTION, POWDER, LYOPHILIZED, FOR SOLUTION INTRAVENOUS at 20:36

## 2021-10-09 RX ADMIN — INSULIN LISPRO PRN UNIT: 100 INJECTION, SOLUTION INTRAVENOUS; SUBCUTANEOUS at 06:11

## 2021-10-09 RX ADMIN — INSULIN LISPRO PRN UNIT: 100 INJECTION, SOLUTION INTRAVENOUS; SUBCUTANEOUS at 17:19

## 2021-10-09 RX ADMIN — VANCOMYCIN HYDROCHLORIDE SCH MLS: 1 INJECTION, POWDER, LYOPHILIZED, FOR SOLUTION INTRAVENOUS at 20:36

## 2021-10-10 LAB
ANION GAP SERPL CALC-SCNC: 15 MMOL/L (ref 10–20)
ANISOCYTOSIS BLD QL SMEAR: (no result) (100X)
BUN SERPL-MCNC: 20 MG/DL (ref 8.4–25.7)
CALCIUM SERPL-MCNC: 10 MG/DL (ref 7.8–10.44)
CHLORIDE SERPL-SCNC: 110 MMOL/L (ref 98–107)
CO2 SERPL-SCNC: 21 MMOL/L (ref 22–29)
CREAT CL PREDICTED SERPL C-G-VRATE: 79 ML/MIN (ref 70–130)
GLUCOSE SERPL-MCNC: 163 MG/DL (ref 70–105)
HGB BLD-MCNC: 8.2 G/DL (ref 13.5–17.5)
MACROCYTES BLD QL SMEAR: (no result) (100X)
MCH RBC QN AUTO: 27.7 PG (ref 27–33)
MCV RBC AUTO: 87.8 FL (ref 81.2–95.1)
MDIFF COMPLETE?: YES
MICROCYTES BLD QL SMEAR: (no result) (100X)
PLATELET # BLD AUTO: 154 10X3/UL (ref 150–450)
POLYCHROMASIA BLD QL SMEAR: (no result) (100X)
POTASSIUM SERPL-SCNC: 4.5 MMOL/L (ref 3.5–5.1)
RBC # BLD AUTO: 2.96 10X6/UL (ref 4.32–5.72)
SODIUM SERPL-SCNC: 141 MMOL/L (ref 136–145)
WBC # BLD AUTO: 7.3 10X3/UL (ref 3.5–10.5)

## 2021-10-10 RX ADMIN — VANCOMYCIN HYDROCHLORIDE SCH MLS: 1 INJECTION, POWDER, LYOPHILIZED, FOR SOLUTION INTRAVENOUS at 20:36

## 2021-10-10 RX ADMIN — INSULIN LISPRO PRN UNIT: 100 INJECTION, SOLUTION INTRAVENOUS; SUBCUTANEOUS at 05:06

## 2021-10-11 LAB — VANCOMYCIN TROUGH SERPL-MCNC: 16.9 UG/ML

## 2021-10-11 RX ADMIN — INSULIN LISPRO PRN UNIT: 100 INJECTION, SOLUTION INTRAVENOUS; SUBCUTANEOUS at 17:51

## 2021-10-11 RX ADMIN — VANCOMYCIN HYDROCHLORIDE SCH MLS: 1 INJECTION, POWDER, LYOPHILIZED, FOR SOLUTION INTRAVENOUS at 21:22

## 2021-10-12 LAB
ANION GAP SERPL CALC-SCNC: 13 MMOL/L (ref 10–20)
BUN SERPL-MCNC: 20 MG/DL (ref 8.4–25.7)
CALCIUM SERPL-MCNC: 9 MG/DL (ref 7.8–10.44)
CHLORIDE SERPL-SCNC: 108 MMOL/L (ref 98–107)
CO2 SERPL-SCNC: 20 MMOL/L (ref 22–29)
CREAT CL PREDICTED SERPL C-G-VRATE: 87 ML/MIN (ref 70–130)
GLUCOSE SERPL-MCNC: 163 MG/DL (ref 70–105)
POTASSIUM SERPL-SCNC: 4.4 MMOL/L (ref 3.5–5.1)
SODIUM SERPL-SCNC: 137 MMOL/L (ref 136–145)

## 2021-10-12 RX ADMIN — INSULIN LISPRO PRN UNIT: 100 INJECTION, SOLUTION INTRAVENOUS; SUBCUTANEOUS at 06:16

## 2021-10-12 RX ADMIN — VANCOMYCIN HYDROCHLORIDE SCH MLS: 1 INJECTION, POWDER, LYOPHILIZED, FOR SOLUTION INTRAVENOUS at 23:24

## 2021-10-13 LAB
ANION GAP SERPL CALC-SCNC: 15 MMOL/L (ref 10–20)
BUN SERPL-MCNC: 16 MG/DL (ref 8.4–25.7)
CALCIUM SERPL-MCNC: 9 MG/DL (ref 7.8–10.44)
CHLORIDE SERPL-SCNC: 107 MMOL/L (ref 98–107)
CO2 SERPL-SCNC: 19 MMOL/L (ref 22–29)
CREAT CL PREDICTED SERPL C-G-VRATE: 86 ML/MIN (ref 70–130)
GLUCOSE SERPL-MCNC: 173 MG/DL (ref 70–105)
HGB BLD-MCNC: 7.6 G/DL (ref 13.5–17.5)
MCH RBC QN AUTO: 27.9 PG (ref 27–33)
MCV RBC AUTO: 83.1 FL (ref 81.2–95.1)
PLATELET # BLD AUTO: 161 10X3/UL (ref 150–450)
POTASSIUM SERPL-SCNC: 4.8 MMOL/L (ref 3.5–5.1)
RBC # BLD AUTO: 2.72 10X6/UL (ref 4.32–5.72)
SODIUM SERPL-SCNC: 136 MMOL/L (ref 136–145)
WBC # BLD AUTO: 7.2 10X3/UL (ref 3.5–10.5)

## 2021-10-13 RX ADMIN — VANCOMYCIN HYDROCHLORIDE SCH MLS: 1 INJECTION, POWDER, LYOPHILIZED, FOR SOLUTION INTRAVENOUS at 21:15

## 2021-10-13 RX ADMIN — INSULIN LISPRO PRN UNIT: 100 INJECTION, SOLUTION INTRAVENOUS; SUBCUTANEOUS at 06:07

## 2021-10-14 LAB
BUN SERPL-MCNC: 18 MG/DL (ref 8.4–25.7)
CREAT CL PREDICTED SERPL C-G-VRATE: 88 ML/MIN (ref 70–130)
VANCOMYCIN TROUGH SERPL-MCNC: 23.5 UG/ML

## 2021-10-14 RX ADMIN — INSULIN LISPRO PRN UNIT: 100 INJECTION, SOLUTION INTRAVENOUS; SUBCUTANEOUS at 13:28

## 2021-10-14 RX ADMIN — VANCOMYCIN HYDROCHLORIDE SCH MLS: 1 INJECTION, POWDER, LYOPHILIZED, FOR SOLUTION INTRAVENOUS at 20:14

## 2021-10-14 RX ADMIN — INSULIN LISPRO PRN UNIT: 100 INJECTION, SOLUTION INTRAVENOUS; SUBCUTANEOUS at 04:24

## 2021-10-15 LAB
BUN SERPL-MCNC: 17 MG/DL (ref 8.4–25.7)
CREAT CL PREDICTED SERPL C-G-VRATE: 84 ML/MIN (ref 70–130)

## 2021-10-15 RX ADMIN — VANCOMYCIN HYDROCHLORIDE SCH MLS: 750 INJECTION, POWDER, LYOPHILIZED, FOR SOLUTION INTRAVENOUS at 20:55

## 2021-10-16 LAB
ANION GAP SERPL CALC-SCNC: 13 MMOL/L (ref 10–20)
BUN SERPL-MCNC: 18 MG/DL (ref 8.4–25.7)
CALCIUM SERPL-MCNC: 9.2 MG/DL (ref 7.8–10.44)
CHLORIDE SERPL-SCNC: 106 MMOL/L (ref 98–107)
CO2 SERPL-SCNC: 22 MMOL/L (ref 22–29)
CREAT CL PREDICTED SERPL C-G-VRATE: 88 ML/MIN (ref 70–130)
GLUCOSE SERPL-MCNC: 178 MG/DL (ref 70–105)
POTASSIUM SERPL-SCNC: 4.8 MMOL/L (ref 3.5–5.1)
SODIUM SERPL-SCNC: 136 MMOL/L (ref 136–145)
VANCOMYCIN TROUGH SERPL-MCNC: 20.5 UG/ML

## 2021-10-16 RX ADMIN — VANCOMYCIN HYDROCHLORIDE SCH MLS: 750 INJECTION, POWDER, LYOPHILIZED, FOR SOLUTION INTRAVENOUS at 20:33

## 2021-10-17 LAB
ANION GAP SERPL CALC-SCNC: 13 MMOL/L (ref 10–20)
BASOPHILS # BLD AUTO: 0 10X3/UL (ref 0–0.2)
BASOPHILS NFR BLD AUTO: 0.4 % (ref 0–2)
BUN SERPL-MCNC: 21 MG/DL (ref 8.4–25.7)
CALCIUM SERPL-MCNC: 9.2 MG/DL (ref 7.8–10.44)
CHLORIDE SERPL-SCNC: 106 MMOL/L (ref 98–107)
CO2 SERPL-SCNC: 22 MMOL/L (ref 22–29)
CREAT CL PREDICTED SERPL C-G-VRATE: 86 ML/MIN (ref 70–130)
EOSINOPHIL # BLD AUTO: 0.4 10X3/UL (ref 0–0.5)
EOSINOPHIL NFR BLD AUTO: 4.5 % (ref 0–6)
GLUCOSE SERPL-MCNC: 184 MG/DL (ref 70–105)
HGB BLD-MCNC: 8.2 G/DL (ref 13.5–17.5)
LYMPHOCYTES NFR BLD AUTO: 21.1 % (ref 18–47)
MCH RBC QN AUTO: 27.8 PG (ref 27–33)
MCV RBC AUTO: 83.7 FL (ref 81.2–95.1)
MONOCYTES # BLD AUTO: 0.7 10X3/UL (ref 0–1.1)
MONOCYTES NFR BLD AUTO: 8.7 % (ref 0–10)
NEUTROPHILS # BLD AUTO: 5 10X3/UL (ref 1.5–8.4)
NEUTROPHILS NFR BLD AUTO: 61.6 % (ref 40–75)
PLATELET # BLD AUTO: 236 10X3/UL (ref 150–450)
POTASSIUM SERPL-SCNC: 5.3 MMOL/L (ref 3.5–5.1)
RBC # BLD AUTO: 2.95 10X6/UL (ref 4.32–5.72)
SODIUM SERPL-SCNC: 136 MMOL/L (ref 136–145)
WBC # BLD AUTO: 8.2 10X3/UL (ref 3.5–10.5)

## 2021-10-18 LAB
ANION GAP SERPL CALC-SCNC: 15 MMOL/L (ref 10–20)
BUN SERPL-MCNC: 18 MG/DL (ref 8.4–25.7)
CALCIUM SERPL-MCNC: 9.7 MG/DL (ref 7.8–10.44)
CHLORIDE SERPL-SCNC: 105 MMOL/L (ref 98–107)
CO2 SERPL-SCNC: 22 MMOL/L (ref 22–29)
CREAT CL PREDICTED SERPL C-G-VRATE: 93 ML/MIN (ref 70–130)
GLUCOSE SERPL-MCNC: 130 MG/DL (ref 70–105)
POTASSIUM SERPL-SCNC: 5.2 MMOL/L (ref 3.5–5.1)
SODIUM SERPL-SCNC: 137 MMOL/L (ref 136–145)

## 2021-10-18 RX ADMIN — LEVETIRACETAM SCH MG: 100 SOLUTION ORAL at 20:56

## 2021-10-18 RX ADMIN — INSULIN LISPRO PRN UNIT: 100 INJECTION, SOLUTION INTRAVENOUS; SUBCUTANEOUS at 06:41

## 2021-10-18 RX ADMIN — INSULIN LISPRO PRN UNIT: 100 INJECTION, SOLUTION INTRAVENOUS; SUBCUTANEOUS at 03:03

## 2021-10-19 LAB
ANION GAP SERPL CALC-SCNC: 15 MMOL/L (ref 10–20)
BASOPHILS # BLD AUTO: 0.1 10X3/UL (ref 0–0.2)
BASOPHILS NFR BLD AUTO: 0.6 % (ref 0–2)
BUN SERPL-MCNC: 20 MG/DL (ref 8.4–25.7)
CALCIUM SERPL-MCNC: 9.6 MG/DL (ref 7.8–10.44)
CHLORIDE SERPL-SCNC: 105 MMOL/L (ref 98–107)
CO2 SERPL-SCNC: 24 MMOL/L (ref 22–29)
CREAT CL PREDICTED SERPL C-G-VRATE: 90 ML/MIN (ref 70–130)
EOSINOPHIL # BLD AUTO: 0.5 10X3/UL (ref 0–0.5)
EOSINOPHIL NFR BLD AUTO: 6.3 % (ref 0–6)
GLUCOSE SERPL-MCNC: 192 MG/DL (ref 70–105)
HGB BLD-MCNC: 9 G/DL (ref 13.5–17.5)
LYMPHOCYTES NFR BLD AUTO: 24.6 % (ref 18–47)
MCH RBC QN AUTO: 27.6 PG (ref 27–33)
MCV RBC AUTO: 86.5 FL (ref 81.2–95.1)
MONOCYTES # BLD AUTO: 0.9 10X3/UL (ref 0–1.1)
MONOCYTES NFR BLD AUTO: 10.8 % (ref 0–10)
NEUTROPHILS # BLD AUTO: 4.5 10X3/UL (ref 1.5–8.4)
NEUTROPHILS NFR BLD AUTO: 53.8 % (ref 40–75)
PLATELET # BLD AUTO: 251 10X3/UL (ref 150–450)
POTASSIUM SERPL-SCNC: 4.7 MMOL/L (ref 3.5–5.1)
RBC # BLD AUTO: 3.26 10X6/UL (ref 4.32–5.72)
SODIUM SERPL-SCNC: 139 MMOL/L (ref 136–145)
WBC # BLD AUTO: 8.4 10X3/UL (ref 3.5–10.5)

## 2021-10-19 RX ADMIN — LEVETIRACETAM SCH MG: 100 SOLUTION ORAL at 19:54

## 2021-10-19 RX ADMIN — LEVETIRACETAM SCH MG: 100 SOLUTION ORAL at 08:50

## 2021-10-19 RX ADMIN — INSULIN LISPRO PRN UNIT: 100 INJECTION, SOLUTION INTRAVENOUS; SUBCUTANEOUS at 11:57

## 2021-10-20 RX ADMIN — INSULIN LISPRO PRN UNIT: 100 INJECTION, SOLUTION INTRAVENOUS; SUBCUTANEOUS at 06:55

## 2021-10-20 RX ADMIN — LEVETIRACETAM SCH MG: 100 SOLUTION ORAL at 10:30

## 2021-10-20 RX ADMIN — LEVETIRACETAM SCH MG: 100 SOLUTION ORAL at 21:21

## 2021-10-21 RX ADMIN — INSULIN LISPRO PRN UNIT: 100 INJECTION, SOLUTION INTRAVENOUS; SUBCUTANEOUS at 05:14

## 2021-10-21 RX ADMIN — INSULIN LISPRO PRN UNIT: 100 INJECTION, SOLUTION INTRAVENOUS; SUBCUTANEOUS at 16:21

## 2021-10-21 RX ADMIN — LEVETIRACETAM SCH MG: 100 SOLUTION ORAL at 20:22

## 2021-10-21 RX ADMIN — LEVETIRACETAM SCH MG: 100 SOLUTION ORAL at 09:48

## 2021-10-22 VITALS — TEMPERATURE: 99.4 F | SYSTOLIC BLOOD PRESSURE: 129 MMHG | DIASTOLIC BLOOD PRESSURE: 70 MMHG

## 2021-10-22 RX ADMIN — LEVETIRACETAM SCH MG: 100 SOLUTION ORAL at 10:55
